# Patient Record
Sex: FEMALE | Race: WHITE | NOT HISPANIC OR LATINO | Employment: OTHER | URBAN - NONMETROPOLITAN AREA
[De-identification: names, ages, dates, MRNs, and addresses within clinical notes are randomized per-mention and may not be internally consistent; named-entity substitution may affect disease eponyms.]

---

## 2017-01-12 ENCOUNTER — OUTSIDE FACILITY SERVICE (OUTPATIENT)
Dept: CARDIOLOGY | Facility: CLINIC | Age: 67
End: 2017-01-12

## 2017-01-12 PROCEDURE — 93660 TILT TABLE EVALUATION: CPT | Performed by: INTERNAL MEDICINE

## 2017-02-03 ENCOUNTER — HOSPITAL ENCOUNTER (OUTPATIENT)
Dept: NEUROLOGY | Age: 67
Discharge: HOME | End: 2017-02-03
Payer: MEDICARE

## 2017-02-03 PROCEDURE — 95909 NRV CNDJ TST 5-6 STUDIES: CPT

## 2017-02-03 PROCEDURE — 95886 MUSC TEST DONE W/N TEST COMP: CPT

## 2017-02-03 PROCEDURE — 95909 NRV CNDJ TST 5-6 STUDIES: CPT | Performed by: PSYCHIATRY & NEUROLOGY

## 2017-02-03 PROCEDURE — 95886 MUSC TEST DONE W/N TEST COMP: CPT | Performed by: PSYCHIATRY & NEUROLOGY

## 2022-08-19 ENCOUNTER — HOME HEALTH ADMISSION (OUTPATIENT)
Dept: HOME HEALTH SERVICES | Facility: HOME HEALTHCARE | Age: 72
End: 2022-08-19

## 2023-10-31 ENCOUNTER — HOME HEALTH ADMISSION (OUTPATIENT)
Dept: HOME HEALTH SERVICES | Facility: HOME HEALTHCARE | Age: 73
End: 2023-10-31
Payer: MEDICARE

## 2023-10-31 ENCOUNTER — TRANSCRIBE ORDERS (OUTPATIENT)
Dept: HOME HEALTH SERVICES | Facility: HOME HEALTHCARE | Age: 73
End: 2023-10-31
Payer: MEDICARE

## 2023-10-31 DIAGNOSIS — G89.4 CHRONIC PAIN SYNDROME: Primary | ICD-10-CM

## 2023-11-02 ENCOUNTER — HOME CARE VISIT (OUTPATIENT)
Dept: HOME HEALTH SERVICES | Facility: CLINIC | Age: 73
End: 2023-11-02
Payer: MEDICARE

## 2023-11-03 ENCOUNTER — HOME CARE VISIT (OUTPATIENT)
Dept: HOME HEALTH SERVICES | Facility: CLINIC | Age: 73
End: 2023-11-03
Payer: MEDICARE

## 2023-11-03 PROCEDURE — G0151 HHCP-SERV OF PT,EA 15 MIN: HCPCS

## 2023-11-05 ENCOUNTER — HOME CARE VISIT (OUTPATIENT)
Dept: HOME HEALTH SERVICES | Facility: HOME HEALTHCARE | Age: 73
End: 2023-11-05
Payer: MEDICARE

## 2023-11-05 VITALS
RESPIRATION RATE: 17 BRPM | HEART RATE: 68 BPM | WEIGHT: 223 LBS | BODY MASS INDEX: 42.1 KG/M2 | HEIGHT: 61 IN | TEMPERATURE: 98 F | DIASTOLIC BLOOD PRESSURE: 78 MMHG | OXYGEN SATURATION: 98 % | SYSTOLIC BLOOD PRESSURE: 118 MMHG

## 2023-11-06 NOTE — HOME HEALTH
SOC Note:  Home Health ordered for: disciplines PT  Reason for Hosp/Primary Dx/Co-morbidities: chronic pain syndrome    Focus of Care: PT provided gait, transfer, balance, exercise/strength and safety training to decrease fall risk and allow safe access to community. Due to chronie pain    Patient's goal Patient and caregiver desires patient to walk better    Current Functional status/mobility/DME: patient requires cga to min assist for ambulation short distances with rolling walker, patient requires min assistance for transfers on/off of bed.    HB status/Living Arrangements: Patient lives with spouse in single story home, has rolling walker and shower seat    Skin Integrity/wound status: no wounds     Code Status: cpr    Fall Risk/Safety concerns: fall risk    Medication issues/Concerns:none    Additional Problems/Concerns: lab draws and nursing concerns    SDOH Barriers (i.e. caregiver concerns, social isolation, transportation, food insecurity, environment, income etc.)/Need for MSW: n/a    Plan for next visit: PT to est HEP next session

## 2023-11-07 NOTE — CASE COMMUNICATION
Patient seen in ED on 11/5/23 with primary diagnosis of DVT; now listed as inpatient as of 11/5/23.

## 2023-11-08 ENCOUNTER — HOME CARE VISIT (OUTPATIENT)
Dept: HOME HEALTH SERVICES | Facility: CLINIC | Age: 73
End: 2023-11-08
Payer: MEDICARE

## 2023-11-08 NOTE — CASE COMMUNICATION
Patient missed an OT visit from [facility] on 11-8-23    Reason: patient hospitalized    For your records only.   As per home health protocol, MD must be notified of missed/cancelled viists; therefore the prescribed frequency was not met.     MD: Karen Fontanez

## 2023-12-22 ENCOUNTER — TRANSCRIBE ORDERS (OUTPATIENT)
Dept: HOME HEALTH SERVICES | Facility: HOME HEALTHCARE | Age: 73
End: 2023-12-22
Payer: MEDICARE

## 2023-12-22 DIAGNOSIS — G89.4 CHRONIC PAIN SYNDROME: Primary | ICD-10-CM

## 2023-12-26 ENCOUNTER — HOME CARE VISIT (OUTPATIENT)
Dept: HOME HEALTH SERVICES | Facility: CLINIC | Age: 73
End: 2023-12-26
Payer: MEDICARE

## 2023-12-26 VITALS
HEART RATE: 81 BPM | RESPIRATION RATE: 18 BRPM | DIASTOLIC BLOOD PRESSURE: 70 MMHG | TEMPERATURE: 97.4 F | SYSTOLIC BLOOD PRESSURE: 110 MMHG | OXYGEN SATURATION: 93 %

## 2023-12-26 PROCEDURE — G0299 HHS/HOSPICE OF RN EA 15 MIN: HCPCS

## 2023-12-28 ENCOUNTER — HOME CARE VISIT (OUTPATIENT)
Dept: HOME HEALTH SERVICES | Facility: CLINIC | Age: 73
End: 2023-12-28
Payer: MEDICARE

## 2023-12-28 VITALS
RESPIRATION RATE: 18 BRPM | OXYGEN SATURATION: 95 % | DIASTOLIC BLOOD PRESSURE: 80 MMHG | HEART RATE: 78 BPM | TEMPERATURE: 97.7 F | SYSTOLIC BLOOD PRESSURE: 120 MMHG

## 2023-12-28 PROCEDURE — G0299 HHS/HOSPICE OF RN EA 15 MIN: HCPCS

## 2023-12-28 NOTE — Clinical Note
Please route to Karen Fontanez MD in [place]  60 Day Summary    Home Health need continues for: nursing, PT, OT, MSW    Primary diagnoses/co-morbidities/recent procedures in past 60 days that impact current episode:Chronic pain, weakness, dementia     Current level of functional ability: patient requires spouse for assistance with ADL's patient ambulating with walker, having multiple falls,  got a lift to help get patient up off floor.maximum assistance     Homebound status and living arrangements: Patient lives with spouse in single story home, has rolling walker and shower seat    Goals accomplished and/or measurable progress toward unmet goals in past 60 days: progress still working towards goals    Focus of care for next 60 days for each discipline ordered: chronic pain    Skin integrity/wound status: intact    Estimated date when home care services will end 3/1/24    SDOH changes/barriers (i.e. Caregiver availability, social isolation, environment, income, transportation access, food insecurity etc.) MSW referred    Need for MSW referral? yes    Plan for next visit: medication education, safety education, pain management

## 2023-12-29 NOTE — HOME HEALTH
Primary diagnoses/co-morbidities/recent procedures in past 60 days that impact current episode:Chronic pain, weakness, dementia     Current level of functional ability: patient requires spouse for assistance with ADL's patient ambulating with walker, having multiple falls,  got a lift to help get patient up off floor.maximum assistance     Homebound status and living arrangements: Patient lives with spouse in single story home, has rolling walker and shower seat    Goals accomplished and/or measurable progress toward unmet goals in past 60 days: progress still working towards goals    Focus of care for next 60 days for each discipline ordered: chronic pain    Skin integrity/wound status: intact    Estimated date when home care services will end 3/1/24    SDOH changes/barriers (i.e. Caregiver availability, social isolation, environment, income, transportation access, food insecurity etc.) MSW referred    Need for MSW referral? yes    Plan for next visit: medication education, safety education, pain management

## 2024-01-03 ENCOUNTER — HOME CARE VISIT (OUTPATIENT)
Dept: HOME HEALTH SERVICES | Facility: CLINIC | Age: 74
End: 2024-01-03
Payer: MEDICARE

## 2024-01-03 VITALS
TEMPERATURE: 97.7 F | SYSTOLIC BLOOD PRESSURE: 110 MMHG | HEART RATE: 70 BPM | OXYGEN SATURATION: 95 % | RESPIRATION RATE: 18 BRPM | DIASTOLIC BLOOD PRESSURE: 60 MMHG

## 2024-01-03 VITALS
DIASTOLIC BLOOD PRESSURE: 60 MMHG | OXYGEN SATURATION: 95 % | TEMPERATURE: 97.7 F | SYSTOLIC BLOOD PRESSURE: 110 MMHG | RESPIRATION RATE: 16 BRPM | HEART RATE: 49 BPM

## 2024-01-03 PROCEDURE — G0152 HHCP-SERV OF OT,EA 15 MIN: HCPCS

## 2024-01-03 PROCEDURE — G0299 HHS/HOSPICE OF RN EA 15 MIN: HCPCS

## 2024-01-03 NOTE — HOME HEALTH
Routine Visit Note: Patient states millicent is feeling better still has a little bit of cough, howver denies any recent falls since last visit, had OT eval prior to SN visit. Patient sitting in recliner with legs elevated. discussed and reviewed medications with spouse.    Skill/education provided: medication education, safety education, pain management    Patient/caregiver response: patient and spouse receptive to teaching    Plan for next visit: medication education, safety education, pain management    Other pertinent info:

## 2024-01-03 NOTE — HOME HEALTH
SUBJECTIVE: patient reports decreased strength and activity tolerance, decreased safety with ADLs with frequent falls  DX:  Chronic pain syndrome  PMH: most recent hospitalization with acute embolism and thrombosis of unspecified deep veins of lower extremity, abnormal gait and mobility, lack of coordination  SURGICAL PROCEDURE: no recent surgery  PRECAUTIONS: fall precautions  OXYGEN USE: cpap  EQUIPMENT: rollator, shower chair  PRIOR LEVEL OF FUNCTION: lives with spouse, had some assist with shower, and dressing  MEDICAL NECESSITY: skilled OT recommended to increase safety and independence with ADLs, UE strength and activity tolerance  PATIENT GOALS: to increase strength and safety with ADLs  COMMUNICATION / CARE COORDINATION:  with admitting RN  CURRENT INSURANCE: Medicare  DATE OF NEXT APPOINTMENT WITH DOCTOR: Spouse has multiple appts tomorrow and patient going with him, will not be home until 3:00. Jan 12 Dr. Fontanez 9:30  ANTICIPATED DISCHARGE PLAN: to self/family care when goals met  PATIENT/CAREGIVER AGREE WITH DISCHARGE PLAN: yes  SPECIFIC INTERVENTIONS AND GOALS TO ADDRESS ON NEXT VISIT: safety with aDL transfers  FREQUENCY AND DURATION: 1 wk 1, 2 wk 3      PATIENT HAS RECEIVED EDUCATION ON COVID-19, PREVENTION, HANDWASHING, SOCIAL DISTANCING PER CDC

## 2024-01-04 ENCOUNTER — HOME CARE VISIT (OUTPATIENT)
Dept: HOME HEALTH SERVICES | Facility: CLINIC | Age: 74
End: 2024-01-04
Payer: MEDICARE

## 2024-01-04 VITALS
TEMPERATURE: 98 F | SYSTOLIC BLOOD PRESSURE: 112 MMHG | DIASTOLIC BLOOD PRESSURE: 64 MMHG | OXYGEN SATURATION: 96 % | RESPIRATION RATE: 18 BRPM | HEART RATE: 64 BPM

## 2024-01-04 PROCEDURE — G0151 HHCP-SERV OF PT,EA 15 MIN: HCPCS

## 2024-01-05 ENCOUNTER — HOME CARE VISIT (OUTPATIENT)
Dept: HOME HEALTH SERVICES | Facility: CLINIC | Age: 74
End: 2024-01-05
Payer: MEDICARE

## 2024-01-05 VITALS
OXYGEN SATURATION: 95 % | DIASTOLIC BLOOD PRESSURE: 70 MMHG | HEART RATE: 68 BPM | RESPIRATION RATE: 18 BRPM | SYSTOLIC BLOOD PRESSURE: 118 MMHG | TEMPERATURE: 97.8 F

## 2024-01-05 PROCEDURE — G0300 HHS/HOSPICE OF LPN EA 15 MIN: HCPCS

## 2024-01-08 ENCOUNTER — HOME CARE VISIT (OUTPATIENT)
Dept: HOME HEALTH SERVICES | Facility: CLINIC | Age: 74
End: 2024-01-08
Payer: MEDICARE

## 2024-01-08 VITALS
RESPIRATION RATE: 18 BRPM | DIASTOLIC BLOOD PRESSURE: 80 MMHG | HEART RATE: 74 BPM | SYSTOLIC BLOOD PRESSURE: 110 MMHG | TEMPERATURE: 97.9 F | OXYGEN SATURATION: 96 %

## 2024-01-08 PROCEDURE — G0158 HHC OT ASSISTANT EA 15: HCPCS

## 2024-01-08 NOTE — HOME HEALTH
Routine Visit Note:    Skill/education provided: fall prevention, medication education    Patient/caregiver response: ongoing    Plan for next visit: fall prevention, medication education    Other pertinent info: No s/s of COVID voiced or noted.

## 2024-01-09 ENCOUNTER — HOME CARE VISIT (OUTPATIENT)
Dept: HOME HEALTH SERVICES | Facility: CLINIC | Age: 74
End: 2024-01-09
Payer: MEDICARE

## 2024-01-09 VITALS
OXYGEN SATURATION: 94 % | SYSTOLIC BLOOD PRESSURE: 118 MMHG | HEART RATE: 80 BPM | TEMPERATURE: 97.6 F | DIASTOLIC BLOOD PRESSURE: 70 MMHG | RESPIRATION RATE: 18 BRPM

## 2024-01-09 VITALS
SYSTOLIC BLOOD PRESSURE: 110 MMHG | RESPIRATION RATE: 18 BRPM | DIASTOLIC BLOOD PRESSURE: 80 MMHG | TEMPERATURE: 97.5 F | OXYGEN SATURATION: 95 % | HEART RATE: 78 BPM

## 2024-01-09 PROCEDURE — G0157 HHC PT ASSISTANT EA 15: HCPCS

## 2024-01-09 PROCEDURE — G0300 HHS/HOSPICE OF LPN EA 15 MIN: HCPCS

## 2024-01-09 NOTE — HOME HEALTH
Routine Visit Note:    Skill/education provided: medication education, fall prevention    Patient/caregiver response: ongoing    Plan for next visit: medication education, fall prevention    Other pertinent info: ongoing

## 2024-01-09 NOTE — HOME HEALTH
COVID:  No signs nor symptoms      SUBJECTIVE: Spouse reports both himself and pt have been sick with a cold        OBJECTIVE/REASON FOR VISIT:  ADL training with compensatory techniques        PHYSICIAN CONTACT: appointment on 1/11 at 9:30        PLAN  ADL training  and: BUE ther ex/HEP

## 2024-01-09 NOTE — HOME HEALTH
Covid 19 pre-screen performed.Pt states no falls or changes to insurance or medicaiton since last visit. Next visit to address gt training, bal, transfer training, and ed on HEP.

## 2024-01-10 ENCOUNTER — HOME CARE VISIT (OUTPATIENT)
Dept: HOME HEALTH SERVICES | Facility: CLINIC | Age: 74
End: 2024-01-10
Payer: MEDICARE

## 2024-01-10 PROCEDURE — G0158 HHC OT ASSISTANT EA 15: HCPCS

## 2024-01-11 ENCOUNTER — HOME CARE VISIT (OUTPATIENT)
Dept: HOME HEALTH SERVICES | Facility: CLINIC | Age: 74
End: 2024-01-11
Payer: MEDICARE

## 2024-01-12 ENCOUNTER — HOME CARE VISIT (OUTPATIENT)
Dept: HOME HEALTH SERVICES | Facility: CLINIC | Age: 74
End: 2024-01-12
Payer: MEDICARE

## 2024-01-12 VITALS
TEMPERATURE: 98.6 F | OXYGEN SATURATION: 97 % | HEART RATE: 85 BPM | SYSTOLIC BLOOD PRESSURE: 114 MMHG | DIASTOLIC BLOOD PRESSURE: 66 MMHG | RESPIRATION RATE: 18 BRPM

## 2024-01-12 VITALS
SYSTOLIC BLOOD PRESSURE: 120 MMHG | HEART RATE: 73 BPM | OXYGEN SATURATION: 95 % | RESPIRATION RATE: 18 BRPM | DIASTOLIC BLOOD PRESSURE: 88 MMHG | TEMPERATURE: 97.4 F

## 2024-01-12 PROCEDURE — G0157 HHC PT ASSISTANT EA 15: HCPCS

## 2024-01-12 NOTE — HOME HEALTH
COVID: no sign or symptoms      SUBJECTIVE: Pt reports not feeling well today        OBJECTIVE/PLAN FOR VISIT: BUE strengthening and HEP        PHYSICIAN CONTACT: appointments 1/11 and 1/12        PLAN for NEXT VISIT:ADL training with compensatory techniques and progress BUE strengthening

## 2024-01-12 NOTE — HOME HEALTH
Covid 19 pre-screen performed.Pt states no falls or changes to insurance or medicaiton since last visit. Pt states she went to her PCP this a.m. and no meds were changed. Next visit to address gt mechanics, transfer training, bal, and ed on HEP.

## 2024-01-15 ENCOUNTER — HOME CARE VISIT (OUTPATIENT)
Dept: HOME HEALTH SERVICES | Facility: HOME HEALTHCARE | Age: 74
End: 2024-01-15
Payer: MEDICARE

## 2024-01-15 NOTE — CASE COMMUNICATION
Patient missed a Nursing visit from [facility] on 1/11/2024.     Reason: MD Appointment.       For your records only.   Per CMS Guidance, MD must be notified of missed/cancelled visits; therefore the prescribed frequency was not met.

## 2024-01-16 ENCOUNTER — HOME CARE VISIT (OUTPATIENT)
Dept: HOME HEALTH SERVICES | Facility: CLINIC | Age: 74
End: 2024-01-16
Payer: MEDICARE

## 2024-01-16 VITALS
SYSTOLIC BLOOD PRESSURE: 110 MMHG | RESPIRATION RATE: 18 BRPM | HEART RATE: 69 BPM | TEMPERATURE: 98 F | DIASTOLIC BLOOD PRESSURE: 60 MMHG | OXYGEN SATURATION: 93 %

## 2024-01-16 VITALS
TEMPERATURE: 98 F | OXYGEN SATURATION: 93 % | DIASTOLIC BLOOD PRESSURE: 60 MMHG | SYSTOLIC BLOOD PRESSURE: 116 MMHG | HEART RATE: 69 BPM | RESPIRATION RATE: 18 BRPM

## 2024-01-16 PROCEDURE — G0152 HHCP-SERV OF OT,EA 15 MIN: HCPCS

## 2024-01-16 PROCEDURE — G0157 HHC PT ASSISTANT EA 15: HCPCS

## 2024-01-16 NOTE — HOME HEALTH
COVID SCREENING: No signs or symptoms of Covid     SUBJECTIVE: reports she showered this morning with spouse assisting and all went well.    FOCUS OF CARE/SKILLED NEED: ADL training on safety with grooming, toileting, functional mobility and ADL transfers    PHYSICIAN CONTACT: to be scheduled    PLAN FOR NEXT VISIT: progress with ADL training.

## 2024-01-16 NOTE — HOME HEALTH
Covid 19 pre-screen performed.Pt states no falls or changes to insurance or medicaiton since last visit. OT just left home and she checked pt's vitals. Next visit to address gt training, transfer training, bal, & HEP.

## 2024-01-17 ENCOUNTER — HOME CARE VISIT (OUTPATIENT)
Dept: HOME HEALTH SERVICES | Facility: CLINIC | Age: 74
End: 2024-01-17
Payer: MEDICARE

## 2024-01-17 VITALS
TEMPERATURE: 97.3 F | SYSTOLIC BLOOD PRESSURE: 104 MMHG | OXYGEN SATURATION: 93 % | HEART RATE: 61 BPM | RESPIRATION RATE: 18 BRPM | DIASTOLIC BLOOD PRESSURE: 72 MMHG

## 2024-01-17 PROCEDURE — G0299 HHS/HOSPICE OF RN EA 15 MIN: HCPCS

## 2024-01-17 NOTE — HOME HEALTH
Routine Visit Note: Patient states millicent is feeling good and doing better cough is now gone, getting stronger working with PT and OT denies any recent falls since last visit, Patient sitting in recliner with legs elevated. discussed and reviewed medications with spouse. Patient and spouse dicussed and decided on only needing 1 visit per week by nursing due to the improvement of patient.  Skill/education provided: medication education, safety education, pain management   Patient/caregiver response: patient and spouse receptive to teaching   Plan for next visit: medication education, safety education, pain management   Other pertinent info: none

## 2024-01-18 ENCOUNTER — HOME CARE VISIT (OUTPATIENT)
Dept: HOME HEALTH SERVICES | Facility: CLINIC | Age: 74
End: 2024-01-18
Payer: MEDICARE

## 2024-01-18 VITALS
DIASTOLIC BLOOD PRESSURE: 70 MMHG | OXYGEN SATURATION: 95 % | HEART RATE: 73 BPM | SYSTOLIC BLOOD PRESSURE: 100 MMHG | RESPIRATION RATE: 16 BRPM | TEMPERATURE: 97.4 F

## 2024-01-18 PROCEDURE — G0158 HHC OT ASSISTANT EA 15: HCPCS

## 2024-01-18 PROCEDURE — G0157 HHC PT ASSISTANT EA 15: HCPCS

## 2024-01-18 PROCEDURE — G0152 HHCP-SERV OF OT,EA 15 MIN: HCPCS

## 2024-01-18 NOTE — HOME HEALTH
Covid 19 pre-screen performed.Pt states no falls or changes to insurance or medicaiton since last visit. Next visit to address gt training, transfer training, bal, and ed on HEP.

## 2024-01-19 VITALS
HEART RATE: 73 BPM | TEMPERATURE: 97.4 F | RESPIRATION RATE: 16 BRPM | SYSTOLIC BLOOD PRESSURE: 100 MMHG | OXYGEN SATURATION: 95 % | DIASTOLIC BLOOD PRESSURE: 70 MMHG

## 2024-01-19 NOTE — HOME HEALTH
COVID: no signs or symptoms      SUBJECTIVE: Patient reports feeling better        OBJECTIVE/REASON for VISIT: ADL transfer training and BUE ther ex/HEP        PHYSICIAN CONTACT:  appointment with Dr. Dey 1/25 at 12:15         PLAN NEXT VISIT:ADL training and progression with HEP

## 2024-01-22 ENCOUNTER — HOME CARE VISIT (OUTPATIENT)
Dept: HOME HEALTH SERVICES | Facility: CLINIC | Age: 74
End: 2024-01-22
Payer: MEDICARE

## 2024-01-22 VITALS
DIASTOLIC BLOOD PRESSURE: 50 MMHG | TEMPERATURE: 97.7 F | HEART RATE: 73 BPM | SYSTOLIC BLOOD PRESSURE: 90 MMHG | OXYGEN SATURATION: 96 % | RESPIRATION RATE: 18 BRPM

## 2024-01-22 PROCEDURE — G0152 HHCP-SERV OF OT,EA 15 MIN: HCPCS

## 2024-01-22 NOTE — HOME HEALTH
COVID SCREENING: No signs or symptoms of Covid     SUBJECTIVE:patient reports feeling weak today    FOCUS OF CARE/SKILLED NEED: ADL training on safety and energy conservation during kitchen tasks.    PHYSICIAN CONTACT: to be scheduled    PLAN FOR NEXT VISIT: complete ADL training, plan for d/c

## 2024-01-23 ENCOUNTER — HOME CARE VISIT (OUTPATIENT)
Dept: HOME HEALTH SERVICES | Facility: CLINIC | Age: 74
End: 2024-01-23
Payer: MEDICARE

## 2024-01-23 VITALS
HEART RATE: 79 BPM | RESPIRATION RATE: 18 BRPM | TEMPERATURE: 97.8 F | SYSTOLIC BLOOD PRESSURE: 100 MMHG | OXYGEN SATURATION: 96 % | DIASTOLIC BLOOD PRESSURE: 80 MMHG

## 2024-01-23 PROCEDURE — G0157 HHC PT ASSISTANT EA 15: HCPCS

## 2024-01-23 NOTE — HOME HEALTH
Covid 19 pre-screen performed.Pt states no falls or changes to insurance or medicaiton since last visit. Next visit to address gt training, transfer training, bal, & progressive HEP.

## 2024-01-24 ENCOUNTER — HOME CARE VISIT (OUTPATIENT)
Dept: HOME HEALTH SERVICES | Facility: CLINIC | Age: 74
End: 2024-01-24
Payer: MEDICARE

## 2024-01-24 VITALS
RESPIRATION RATE: 18 BRPM | OXYGEN SATURATION: 94 % | TEMPERATURE: 97.2 F | HEART RATE: 69 BPM | DIASTOLIC BLOOD PRESSURE: 55 MMHG | SYSTOLIC BLOOD PRESSURE: 105 MMHG

## 2024-01-24 PROCEDURE — G0152 HHCP-SERV OF OT,EA 15 MIN: HCPCS

## 2024-01-24 NOTE — HOME HEALTH
COVID SCREENING: No signs or symptoms of Covid     SUBJECTIVE: verbalized understanding of all dc education. Patient reports some pain in right sholder with ther-ex. Exercises modified.    FOCUS OF CARE/SKILLED NEED: completed training on HEP and ADLs    PHYSICIAN CONTACT: today with Dr. Lam

## 2024-01-25 ENCOUNTER — HOME CARE VISIT (OUTPATIENT)
Dept: HOME HEALTH SERVICES | Facility: CLINIC | Age: 74
End: 2024-01-25
Payer: MEDICARE

## 2024-01-25 VITALS
TEMPERATURE: 97.5 F | HEART RATE: 68 BPM | SYSTOLIC BLOOD PRESSURE: 104 MMHG | OXYGEN SATURATION: 95 % | DIASTOLIC BLOOD PRESSURE: 80 MMHG | RESPIRATION RATE: 18 BRPM

## 2024-01-25 PROCEDURE — G0157 HHC PT ASSISTANT EA 15: HCPCS

## 2024-01-25 NOTE — HOME HEALTH
Covid 19 pre-screen performed.Pt states no falls or changes to insurance or medicaiton since last visit. Pt states she has an appt today w/Dr Dey for pain injection. Next visit to address gt training, bal, & ed on HEP.

## 2024-01-26 ENCOUNTER — HOME CARE VISIT (OUTPATIENT)
Dept: HOME HEALTH SERVICES | Facility: CLINIC | Age: 74
End: 2024-01-26
Payer: MEDICARE

## 2024-01-26 PROCEDURE — G0300 HHS/HOSPICE OF LPN EA 15 MIN: HCPCS

## 2024-01-28 VITALS
TEMPERATURE: 97.1 F | SYSTOLIC BLOOD PRESSURE: 112 MMHG | OXYGEN SATURATION: 95 % | DIASTOLIC BLOOD PRESSURE: 78 MMHG | RESPIRATION RATE: 20 BRPM | HEART RATE: 66 BPM

## 2024-01-29 NOTE — HOME HEALTH
Routine Visit Note:    SKill/Education Provided: Assessment, education-fall prevention/med review/nutrition reinforcement    Patient/Caregiver Response: Patient voices understanding with all education this visit.     Falls: No    Med Changes: No    Physician Contact: No     Plan for Next Visit: Assessment, education    Other: No s/s of Covid noted.

## 2024-01-31 ENCOUNTER — HOME CARE VISIT (OUTPATIENT)
Dept: HOME HEALTH SERVICES | Facility: CLINIC | Age: 74
End: 2024-01-31
Payer: MEDICARE

## 2024-01-31 VITALS
TEMPERATURE: 97.5 F | OXYGEN SATURATION: 95 % | HEART RATE: 74 BPM | DIASTOLIC BLOOD PRESSURE: 62 MMHG | RESPIRATION RATE: 18 BRPM | SYSTOLIC BLOOD PRESSURE: 90 MMHG

## 2024-01-31 VITALS
HEART RATE: 74 BPM | TEMPERATURE: 97.5 F | RESPIRATION RATE: 18 BRPM | DIASTOLIC BLOOD PRESSURE: 62 MMHG | SYSTOLIC BLOOD PRESSURE: 90 MMHG | OXYGEN SATURATION: 95 %

## 2024-01-31 PROCEDURE — G0157 HHC PT ASSISTANT EA 15: HCPCS

## 2024-01-31 PROCEDURE — G0299 HHS/HOSPICE OF RN EA 15 MIN: HCPCS

## 2024-01-31 NOTE — HOME HEALTH
Routine Visit Note:     SKill/Education Provided: Assessment, education-fall prevention/med review/nutrition reinforcement     Patient/Caregiver Response: patient and spouse receptive to teaching     Falls: No     Med Changes: No     Physician Contact: No     Plan for Next Visit: medication education, safety education, pain management     Other: No s/s of Covid noted

## 2024-01-31 NOTE — HOME HEALTH
Covid 19 pre-screen performed.Pt states no falls or changes to insurance or medicaiton since last visit. Pt very fatigued due to going out yesterday.  PT to assess next visit for d/c.

## 2024-02-01 ENCOUNTER — HOME CARE VISIT (OUTPATIENT)
Dept: HOME HEALTH SERVICES | Facility: CLINIC | Age: 74
End: 2024-02-01
Payer: MEDICARE

## 2024-02-01 PROCEDURE — G0151 HHCP-SERV OF PT,EA 15 MIN: HCPCS

## 2024-02-02 VITALS
RESPIRATION RATE: 18 BRPM | DIASTOLIC BLOOD PRESSURE: 70 MMHG | OXYGEN SATURATION: 95 % | SYSTOLIC BLOOD PRESSURE: 120 MMHG | TEMPERATURE: 98 F | HEART RATE: 68 BPM

## 2024-02-08 ENCOUNTER — HOME CARE VISIT (OUTPATIENT)
Dept: HOME HEALTH SERVICES | Facility: CLINIC | Age: 74
End: 2024-02-08
Payer: MEDICARE

## 2024-02-12 NOTE — CASE COMMUNICATION
Patient missed a Nursing visit from [facility] on 2/08/2024.     Reason:Patient has 2 MD Appointments today and  did not want visit this week.       For your records only.   Per CMS Guidance, MD must be notified of missed/cancelled visits; therefore the prescribed frequency was not met.

## 2024-02-15 ENCOUNTER — HOME CARE VISIT (OUTPATIENT)
Dept: HOME HEALTH SERVICES | Facility: CLINIC | Age: 74
End: 2024-02-15
Payer: MEDICARE

## 2024-02-15 VITALS
OXYGEN SATURATION: 96 % | RESPIRATION RATE: 18 BRPM | DIASTOLIC BLOOD PRESSURE: 64 MMHG | HEART RATE: 62 BPM | TEMPERATURE: 98.2 F | SYSTOLIC BLOOD PRESSURE: 118 MMHG

## 2024-02-15 PROCEDURE — G0300 HHS/HOSPICE OF LPN EA 15 MIN: HCPCS

## 2024-02-22 ENCOUNTER — HOME CARE VISIT (OUTPATIENT)
Dept: HOME HEALTH SERVICES | Facility: CLINIC | Age: 74
End: 2024-02-22
Payer: MEDICARE

## 2024-02-23 ENCOUNTER — HOME CARE VISIT (OUTPATIENT)
Dept: HOME HEALTH SERVICES | Facility: CLINIC | Age: 74
End: 2024-02-23
Payer: MEDICARE

## 2024-02-23 PROCEDURE — G0299 HHS/HOSPICE OF RN EA 15 MIN: HCPCS

## 2024-02-25 VITALS
TEMPERATURE: 97.3 F | HEART RATE: 65 BPM | SYSTOLIC BLOOD PRESSURE: 122 MMHG | OXYGEN SATURATION: 96 % | RESPIRATION RATE: 18 BRPM | DIASTOLIC BLOOD PRESSURE: 82 MMHG

## 2024-02-26 NOTE — HOME HEALTH
Routine Visit Note: Patient is agreeable and states she feels ready and comfotable with discharge, NOMNC signed by patient    Skill/education provided: Neuro check, fall prevention, medication education     Patient/caregiver response: Patient educated on fall prevention and medication education. Pt. v/u by teach back method.     Plan for next visit: Neuro check, fall prevention, medication education     Other pertinent info: No s/s of COVID voiced or noted.

## 2024-02-29 ENCOUNTER — HOME CARE VISIT (OUTPATIENT)
Dept: HOME HEALTH SERVICES | Facility: CLINIC | Age: 74
End: 2024-02-29
Payer: MEDICARE

## 2024-02-29 VITALS
TEMPERATURE: 97.7 F | DIASTOLIC BLOOD PRESSURE: 68 MMHG | RESPIRATION RATE: 18 BRPM | SYSTOLIC BLOOD PRESSURE: 102 MMHG | OXYGEN SATURATION: 97 % | HEART RATE: 63 BPM

## 2024-02-29 PROCEDURE — G0299 HHS/HOSPICE OF RN EA 15 MIN: HCPCS

## 2024-02-29 NOTE — Clinical Note
Please route to Karen Fontanez in [place]    Discharge Summary/Summary of Care Provided: patient reciceved HH for abnormal gait and mobility, lack of coordination, weakness and need for assistance for ADLS's and chronic pain, at this time all goals are met and patient and spouse are agreeable for discharge.     Patient received home health for diagnosis: Chronic pain syndrome     Current level of functional ability: ambulates in home with walker, spouse assists with ADLS    Living arrangements: lives in home with spouse    Progress towards goals and/or Were all goals met? all goals met    If not all goals met, barriers that prevented patient from meeting goals: all goals met    SDOH concerns (i.e. Caregiver availability, social isolation, environment, income, transportation access, food insecurity etc.)none    Follow-up appointment plans and community resources provided: in folder    Other imporant information. none

## 2024-03-01 NOTE — HOME HEALTH
Discharge Summary/Summary of Care Provided: patient reciceved HH for abnormal gait and mobility, lack of coordination, weakness and need for assistance for ADLS's and chronic pain, at this time all goals are met and patient and spouse are agreeable for discharge.     Patient received home health for diagnosis: Chronic pain syndrome     Current level of functional ability: ambulates in home with walker, spouse assists with ADLS    Living arrangements: lives in home with spouse    Progress towards goals and/or Were all goals met? all goals met    If not all goals met, barriers that prevented patient from meeting goals: all goals met    [facility] concerns (i.e. Caregiver availability, social isolation, environment, income, transportation access, food insecurity etc.)none    Follow-up appointment plans and community resources provided: in folder    Other imporant information. none

## 2024-06-18 ENCOUNTER — HOSPITAL ENCOUNTER (INPATIENT)
Age: 74
LOS: 3 days | Discharge: HOME | End: 2024-06-21
Attending: INTERNAL MEDICINE
Payer: MEDICARE

## 2024-06-18 PROCEDURE — 1200000000 HC SEMI PRIVATE

## 2024-06-19 ENCOUNTER — APPOINTMENT (OUTPATIENT)
Dept: ULTRASOUND IMAGING | Age: 74
End: 2024-06-19
Attending: INTERNAL MEDICINE
Payer: MEDICARE

## 2024-06-19 PROBLEM — K21.9 GASTRO-ESOPHAGEAL REFLUX DISEASE WITHOUT ESOPHAGITIS: Status: ACTIVE | Noted: 2022-07-29

## 2024-06-19 PROBLEM — I25.10 ATHEROSCLEROTIC HEART DISEASE OF NATIVE CORONARY ARTERY WITHOUT ANGINA PECTORIS: Status: ACTIVE | Noted: 2022-07-29

## 2024-06-19 PROBLEM — F32.9 MAJOR DEPRESSIVE DISORDER, SINGLE EPISODE, UNSPECIFIED: Status: ACTIVE | Noted: 2022-07-29

## 2024-06-19 PROBLEM — I10 ESSENTIAL HYPERTENSION: Status: ACTIVE | Noted: 2024-06-19

## 2024-06-19 PROBLEM — F41.1 GENERALIZED ANXIETY DISORDER: Status: ACTIVE | Noted: 2022-07-29

## 2024-06-19 PROBLEM — N18.30 STAGE 3 CHRONIC KIDNEY DISEASE (HCC): Status: ACTIVE | Noted: 2020-09-30

## 2024-06-19 PROBLEM — E78.5 HYPERLIPIDEMIA: Status: ACTIVE | Noted: 2024-06-19

## 2024-06-19 PROBLEM — G47.33 OBSTRUCTIVE SLEEP APNEA (ADULT) (PEDIATRIC): Status: ACTIVE | Noted: 2022-07-29

## 2024-06-19 PROBLEM — N17.9 AKI (ACUTE KIDNEY INJURY) (HCC): Status: ACTIVE | Noted: 2024-06-19

## 2024-06-19 PROBLEM — F31.9 BIPOLAR DISORDER (HCC): Status: ACTIVE | Noted: 2022-07-29

## 2024-06-19 PROBLEM — G47.00 INSOMNIA: Status: ACTIVE | Noted: 2024-06-19

## 2024-06-19 PROBLEM — I50.22 HEART FAILURE WITH MILDLY REDUCED EJECTION FRACTION (HCC): Status: ACTIVE | Noted: 2023-09-19

## 2024-06-19 LAB
25(OH)D3 SERPL-MCNC: 82.4 NG/ML
ALBUMIN SERPL-MCNC: 3.6 G/DL (ref 3.5–5.2)
ALP SERPL-CCNC: 120 U/L (ref 35–104)
ALT SERPL-CCNC: 10 U/L (ref 5–33)
ANION GAP SERPL CALCULATED.3IONS-SCNC: 18 MMOL/L (ref 7–19)
AST SERPL-CCNC: 17 U/L (ref 5–32)
BASOPHILS # BLD: 0.1 K/UL (ref 0–0.2)
BASOPHILS NFR BLD: 0.7 % (ref 0–1)
BILIRUB SERPL-MCNC: 0.5 MG/DL (ref 0.2–1.2)
BUN SERPL-MCNC: 73 MG/DL (ref 8–23)
CALCIUM SERPL-MCNC: 9.4 MG/DL (ref 8.8–10.2)
CHLORIDE SERPL-SCNC: 98 MMOL/L (ref 98–111)
CO2 SERPL-SCNC: 25 MMOL/L (ref 22–29)
CREAT SERPL-MCNC: 4.2 MG/DL (ref 0.5–0.9)
CREAT UR-MCNC: 75.3 MG/DL (ref 28–217)
EOSINOPHIL # BLD: 0.3 K/UL (ref 0–0.6)
EOSINOPHIL NFR BLD: 2.9 % (ref 0–5)
ERYTHROCYTE [DISTWIDTH] IN BLOOD BY AUTOMATED COUNT: 12.4 % (ref 11.5–14.5)
GLUCOSE SERPL-MCNC: 100 MG/DL (ref 74–109)
HAV IGM SERPL QL IA: NORMAL
HBV CORE IGM SERPL QL IA: NORMAL
HBV SURFACE AB SERPL IA-ACNC: NORMAL M[IU]/ML
HBV SURFACE AG SERPL QL IA: NORMAL
HCT VFR BLD AUTO: 39.3 % (ref 37–47)
HCV AB SERPL QL IA: NORMAL
HGB BLD-MCNC: 12.5 G/DL (ref 12–16)
IMM GRANULOCYTES # BLD: 0 K/UL
LYMPHOCYTES # BLD: 3.9 K/UL (ref 1.1–4.5)
LYMPHOCYTES NFR BLD: 43.3 % (ref 20–40)
MAGNESIUM SERPL-MCNC: 2.7 MG/DL (ref 1.6–2.4)
MCH RBC QN AUTO: 32.3 PG (ref 27–31)
MCHC RBC AUTO-ENTMCNC: 31.8 G/DL (ref 33–37)
MCV RBC AUTO: 101.6 FL (ref 81–99)
MICROALBUMIN UR-MCNC: <1.2 MG/DL (ref 0–19)
MICROALBUMIN/CREAT UR-RTO: NORMAL MG/G
MONOCYTES # BLD: 0.8 K/UL (ref 0–0.9)
MONOCYTES NFR BLD: 8.6 % (ref 0–10)
NEUTROPHILS # BLD: 4 K/UL (ref 1.5–7.5)
NEUTS SEG NFR BLD: 44.1 % (ref 50–65)
PLATELET # BLD AUTO: 354 K/UL (ref 130–400)
PMV BLD AUTO: 10 FL (ref 9.4–12.3)
POTASSIUM SERPL-SCNC: 3.7 MMOL/L (ref 3.5–5)
PROT SERPL-MCNC: 5.7 G/DL (ref 6.6–8.7)
PTH-INTACT SERPL-MCNC: 77.3 PG/ML (ref 15–65)
RBC # BLD AUTO: 3.87 M/UL (ref 4.2–5.4)
SODIUM SERPL-SCNC: 141 MMOL/L (ref 136–145)
SODIUM UR-SCNC: 65 MMOL/L
WBC # BLD AUTO: 9.1 K/UL (ref 4.8–10.8)

## 2024-06-19 PROCEDURE — 94760 N-INVAS EAR/PLS OXIMETRY 1: CPT

## 2024-06-19 PROCEDURE — 36415 COLL VENOUS BLD VENIPUNCTURE: CPT

## 2024-06-19 PROCEDURE — 80053 COMPREHEN METABOLIC PANEL: CPT

## 2024-06-19 PROCEDURE — 6360000002 HC RX W HCPCS: Performed by: INTERNAL MEDICINE

## 2024-06-19 PROCEDURE — 86706 HEP B SURFACE ANTIBODY: CPT

## 2024-06-19 PROCEDURE — 6370000000 HC RX 637 (ALT 250 FOR IP): Performed by: INTERNAL MEDICINE

## 2024-06-19 PROCEDURE — 84300 ASSAY OF URINE SODIUM: CPT

## 2024-06-19 PROCEDURE — 82043 UR ALBUMIN QUANTITATIVE: CPT

## 2024-06-19 PROCEDURE — 1200000000 HC SEMI PRIVATE

## 2024-06-19 PROCEDURE — 83735 ASSAY OF MAGNESIUM: CPT

## 2024-06-19 PROCEDURE — 83970 ASSAY OF PARATHORMONE: CPT

## 2024-06-19 PROCEDURE — 2580000003 HC RX 258: Performed by: INTERNAL MEDICINE

## 2024-06-19 PROCEDURE — 80074 ACUTE HEPATITIS PANEL: CPT

## 2024-06-19 PROCEDURE — 85025 COMPLETE CBC W/AUTO DIFF WBC: CPT

## 2024-06-19 PROCEDURE — 82306 VITAMIN D 25 HYDROXY: CPT

## 2024-06-19 PROCEDURE — 82570 ASSAY OF URINE CREATININE: CPT

## 2024-06-19 PROCEDURE — 76770 US EXAM ABDO BACK WALL COMP: CPT

## 2024-06-19 RX ORDER — SODIUM CHLORIDE, SODIUM LACTATE, POTASSIUM CHLORIDE, CALCIUM CHLORIDE 600; 310; 30; 20 MG/100ML; MG/100ML; MG/100ML; MG/100ML
INJECTION, SOLUTION INTRAVENOUS CONTINUOUS
Status: DISCONTINUED | OUTPATIENT
Start: 2024-06-19 | End: 2024-06-22 | Stop reason: HOSPADM

## 2024-06-19 RX ORDER — FLUTICASONE PROPIONATE 50 MCG
2 SPRAY, SUSPENSION (ML) NASAL DAILY
COMMUNITY

## 2024-06-19 RX ORDER — ACETAMINOPHEN 325 MG/1
650 TABLET ORAL EVERY 6 HOURS PRN
Status: DISCONTINUED | OUTPATIENT
Start: 2024-06-19 | End: 2024-06-22 | Stop reason: HOSPADM

## 2024-06-19 RX ORDER — ONDANSETRON 2 MG/ML
4 INJECTION INTRAMUSCULAR; INTRAVENOUS EVERY 6 HOURS PRN
Status: DISCONTINUED | OUTPATIENT
Start: 2024-06-19 | End: 2024-06-22 | Stop reason: HOSPADM

## 2024-06-19 RX ORDER — HEPARIN SODIUM 5000 [USP'U]/ML
5000 INJECTION, SOLUTION INTRAVENOUS; SUBCUTANEOUS EVERY 8 HOURS SCHEDULED
Status: DISCONTINUED | OUTPATIENT
Start: 2024-06-19 | End: 2024-06-19

## 2024-06-19 RX ORDER — TRAZODONE HYDROCHLORIDE 100 MG/1
200 TABLET ORAL NIGHTLY
Status: DISCONTINUED | OUTPATIENT
Start: 2024-06-19 | End: 2024-06-22 | Stop reason: HOSPADM

## 2024-06-19 RX ORDER — ACETAMINOPHEN 650 MG/1
650 SUPPOSITORY RECTAL EVERY 6 HOURS PRN
Status: DISCONTINUED | OUTPATIENT
Start: 2024-06-19 | End: 2024-06-22 | Stop reason: HOSPADM

## 2024-06-19 RX ORDER — LORAZEPAM 1 MG/1
0.5 TABLET ORAL 3 TIMES DAILY
Status: DISCONTINUED | OUTPATIENT
Start: 2024-06-19 | End: 2024-06-22 | Stop reason: HOSPADM

## 2024-06-19 RX ORDER — FLUTICASONE PROPIONATE 50 MCG
2 SPRAY, SUSPENSION (ML) NASAL DAILY
Status: DISCONTINUED | OUTPATIENT
Start: 2024-06-19 | End: 2024-06-22 | Stop reason: HOSPADM

## 2024-06-19 RX ORDER — LORAZEPAM 1 MG/1
1 TABLET ORAL DAILY
Status: ON HOLD | COMMUNITY
End: 2024-06-21 | Stop reason: HOSPADM

## 2024-06-19 RX ORDER — PHENOL 1.4 %
1 AEROSOL, SPRAY (ML) MUCOUS MEMBRANE DAILY
COMMUNITY

## 2024-06-19 RX ORDER — ESCITALOPRAM OXALATE 20 MG/1
20 TABLET ORAL DAILY
COMMUNITY

## 2024-06-19 RX ORDER — HYDROCODONE BITARTRATE AND ACETAMINOPHEN 10; 325 MG/1; MG/1
1 TABLET ORAL EVERY 8 HOURS PRN
COMMUNITY

## 2024-06-19 RX ORDER — SIMVASTATIN 20 MG
20 TABLET ORAL DAILY
COMMUNITY

## 2024-06-19 RX ORDER — NITROGLYCERIN 0.4 MG/1
0.4 TABLET SUBLINGUAL EVERY 5 MIN PRN
COMMUNITY

## 2024-06-19 RX ORDER — LANOLIN ALCOHOL/MO/W.PET/CERES
400 CREAM (GRAM) TOPICAL DAILY
COMMUNITY

## 2024-06-19 RX ORDER — ESCITALOPRAM OXALATE 10 MG/1
20 TABLET ORAL DAILY
Status: DISCONTINUED | OUTPATIENT
Start: 2024-06-19 | End: 2024-06-22 | Stop reason: HOSPADM

## 2024-06-19 RX ORDER — TRAZODONE HYDROCHLORIDE 50 MG/1
100 TABLET ORAL NIGHTLY
Status: ON HOLD | COMMUNITY
End: 2024-06-21 | Stop reason: HOSPADM

## 2024-06-19 RX ORDER — TRAZODONE HYDROCHLORIDE 100 MG/1
200 TABLET ORAL NIGHTLY
COMMUNITY

## 2024-06-19 RX ORDER — FUROSEMIDE 40 MG/1
40 TABLET ORAL 2 TIMES DAILY
COMMUNITY

## 2024-06-19 RX ORDER — POLYETHYLENE GLYCOL 3350 17 G/17G
17 POWDER, FOR SOLUTION ORAL DAILY PRN
Status: DISCONTINUED | OUTPATIENT
Start: 2024-06-19 | End: 2024-06-22 | Stop reason: HOSPADM

## 2024-06-19 RX ORDER — LANOLIN ALCOHOL/MO/W.PET/CERES
400 CREAM (GRAM) TOPICAL DAILY
Status: DISCONTINUED | OUTPATIENT
Start: 2024-06-19 | End: 2024-06-22 | Stop reason: HOSPADM

## 2024-06-19 RX ORDER — CALCIUM CARBONATE 500(1250)
1 TABLET ORAL DAILY
Status: DISCONTINUED | OUTPATIENT
Start: 2024-06-19 | End: 2024-06-22 | Stop reason: HOSPADM

## 2024-06-19 RX ORDER — PANTOPRAZOLE SODIUM 40 MG/1
40 TABLET, DELAYED RELEASE ORAL
Status: DISCONTINUED | OUTPATIENT
Start: 2024-06-19 | End: 2024-06-22 | Stop reason: HOSPADM

## 2024-06-19 RX ORDER — ONDANSETRON 4 MG/1
4 TABLET, ORALLY DISINTEGRATING ORAL EVERY 8 HOURS PRN
Status: DISCONTINUED | OUTPATIENT
Start: 2024-06-19 | End: 2024-06-22 | Stop reason: HOSPADM

## 2024-06-19 RX ORDER — TRAZODONE HYDROCHLORIDE 100 MG/1
100 TABLET ORAL NIGHTLY
Status: DISCONTINUED | OUTPATIENT
Start: 2024-06-19 | End: 2024-06-19

## 2024-06-19 RX ORDER — LANOLIN ALCOHOL/MO/W.PET/CERES
5000 CREAM (GRAM) TOPICAL DAILY
COMMUNITY

## 2024-06-19 RX ORDER — OMEPRAZOLE 20 MG/1
40 CAPSULE, DELAYED RELEASE ORAL 2 TIMES DAILY
COMMUNITY

## 2024-06-19 RX ORDER — SPIRONOLACTONE 25 MG/1
25 TABLET ORAL DAILY
COMMUNITY

## 2024-06-19 RX ORDER — FAMOTIDINE 40 MG/1
40 TABLET, FILM COATED ORAL DAILY
Status: ON HOLD | COMMUNITY
End: 2024-06-21 | Stop reason: HOSPADM

## 2024-06-19 RX ORDER — VITAMIN B COMPLEX
1 CAPSULE ORAL DAILY
COMMUNITY

## 2024-06-19 RX ORDER — ISOSORBIDE MONONITRATE 30 MG/1
30 TABLET, EXTENDED RELEASE ORAL DAILY
COMMUNITY

## 2024-06-19 RX ORDER — LORAZEPAM 0.5 MG/1
0.5 TABLET ORAL 3 TIMES DAILY
COMMUNITY

## 2024-06-19 RX ORDER — HYDROCODONE BITARTRATE AND ACETAMINOPHEN 10; 325 MG/1; MG/1
1 TABLET ORAL EVERY 8 HOURS PRN
Status: DISCONTINUED | OUTPATIENT
Start: 2024-06-19 | End: 2024-06-22 | Stop reason: HOSPADM

## 2024-06-19 RX ORDER — METOPROLOL SUCCINATE 25 MG/1
25 TABLET, EXTENDED RELEASE ORAL 2 TIMES DAILY
Status: DISCONTINUED | OUTPATIENT
Start: 2024-06-19 | End: 2024-06-22 | Stop reason: HOSPADM

## 2024-06-19 RX ORDER — METOPROLOL SUCCINATE 25 MG/1
25 TABLET, EXTENDED RELEASE ORAL 2 TIMES DAILY
COMMUNITY

## 2024-06-19 RX ORDER — ASPIRIN 81 MG/1
81 TABLET ORAL DAILY
COMMUNITY

## 2024-06-19 RX ORDER — ATORVASTATIN CALCIUM 10 MG/1
10 TABLET, FILM COATED ORAL DAILY
Status: DISCONTINUED | OUTPATIENT
Start: 2024-06-19 | End: 2024-06-22 | Stop reason: HOSPADM

## 2024-06-19 RX ADMIN — LORAZEPAM 0.5 MG: 1 TABLET ORAL at 15:58

## 2024-06-19 RX ADMIN — PANTOPRAZOLE SODIUM 40 MG: 40 TABLET, DELAYED RELEASE ORAL at 15:58

## 2024-06-19 RX ADMIN — POLYETHYLENE GLYCOL 3350 17 G: 17 POWDER, FOR SOLUTION ORAL at 16:05

## 2024-06-19 RX ADMIN — HEPARIN SODIUM 5000 UNITS: 5000 INJECTION INTRAVENOUS; SUBCUTANEOUS at 14:13

## 2024-06-19 RX ADMIN — ESCITALOPRAM OXALATE 20 MG: 10 TABLET ORAL at 15:58

## 2024-06-19 RX ADMIN — ATORVASTATIN CALCIUM 10 MG: 10 TABLET, FILM COATED ORAL at 15:58

## 2024-06-19 RX ADMIN — APIXABAN 5 MG: 5 TABLET, FILM COATED ORAL at 20:59

## 2024-06-19 RX ADMIN — LORAZEPAM 0.5 MG: 1 TABLET ORAL at 20:59

## 2024-06-19 RX ADMIN — HYDROCODONE BITARTRATE AND ACETAMINOPHEN 1 TABLET: 10; 325 TABLET ORAL at 11:11

## 2024-06-19 RX ADMIN — CALCIUM 500 MG: 500 TABLET ORAL at 15:58

## 2024-06-19 RX ADMIN — FLUTICASONE PROPIONATE 2 SPRAY: 50 SPRAY, METERED NASAL at 16:02

## 2024-06-19 RX ADMIN — FOLIC ACID TAB 400 MCG 400 MCG: 400 TAB at 15:58

## 2024-06-19 RX ADMIN — SODIUM CHLORIDE, POTASSIUM CHLORIDE, SODIUM LACTATE AND CALCIUM CHLORIDE: 600; 310; 30; 20 INJECTION, SOLUTION INTRAVENOUS at 00:53

## 2024-06-19 RX ADMIN — HEPARIN SODIUM 5000 UNITS: 5000 INJECTION INTRAVENOUS; SUBCUTANEOUS at 03:21

## 2024-06-19 RX ADMIN — TRAZODONE HYDROCHLORIDE 200 MG: 100 TABLET ORAL at 20:59

## 2024-06-19 RX ADMIN — TRAZODONE HYDROCHLORIDE 100 MG: 100 TABLET ORAL at 00:52

## 2024-06-19 ASSESSMENT — PAIN SCALES - GENERAL: PAINLEVEL_OUTOF10: 8

## 2024-06-19 NOTE — PROGRESS NOTES
Ileana Odonnell arrived to room # 511.   Presented with: kidney failure  Mental Status: Patient is oriented, alert, coherent, logical, thought processes intact, and able to concentrate and follow conversation.   Vitals:    06/18/24 2357   BP: (!) 102/50   Pulse: 56   Resp: 16   Temp: 98.3 °F (36.8 °C)   SpO2: 98%     Patient safety contract and falls prevention contract reviewed with patient Yes.  Oriented Patient to room.  Call light within reach. Yes.  Needs, issues or concerns expressed at this time: no.      Electronically signed by Cindy Beasley RN on 6/19/2024 at 1:06 AM

## 2024-06-19 NOTE — PROGRESS NOTES
4 Eyes Skin Assessment     NAME:  Ileana Odonnell  YOB: 1950  MEDICAL RECORD NUMBER:  [id number]    The patient is being assessed for  Admission    I agree that at least one RN has performed a thorough Head to Toe Skin Assessment on the patient. ALL assessment sites listed below have been assessed.      Areas assessed by both nurses:    Head, Face, Ears, Shoulders, Back, Chest, Arms, Elbows, Hands, Sacrum. Buttock, Coccyx, Ischium, Legs. Feet and Heels, and Under Medical Devices         Does the Patient have a Wound? No noted wound(s)       [place] Prevention initiated by RN: No  Wound Care Orders initiated by RN: No    Pressure Injury (Stage 3,4, Unstageable, DTI, NWPT, and Complex wounds) if present, place Wound referral order by RN under : No    New Ostomies, if present place, Ostomy referral order under : No     Nurse 1 eSignature: Electronically signed by Cindy Beasley RN on 6/19/24 at 1:07 AM CDT    **SHARE this note so that the co-signing nurse can place an eSignature**    Nurse 2 eSignature: Electronically signed by Hema King RN on 6/19/24 at 1:20 AM CDT

## 2024-06-19 NOTE — H&P
Hospitalist History & Physical  [facility]    Patient: Ileana Odonnell   : 1950   MRN: [id number]  Code Status: Full Code  PCP: Shiloh Leyva MD  Date of Service: 2024    Chief Complaint:   Abnormal lab work    History of Present Illness:   73-year-old female with past medical history as listed below initially presented to OSH ER with abnormal lab work.  History of CKD unknown stage.  Lab work revealed RIVKA.  Patient transferred to this facility for escalation of care.  Upon interview and examination, patient states she has not been feeling well x 1 week.  Poor oral intake.  Decreased urine output.  Denies dysuria, hematuria, nausea, vomiting, abdominal pain, chest pain, or palpitations.  No further history provided.    Review of Systems:   A comprehensive review of systems was negative except for: Genitourinary: positive for decreased urine output    Past Medical History:     Past Medical History:   Diagnosis Date    Anxiety     Back pain     [place] esophagus     Bipolar disorder (HCC)     Blood circulation, collateral     Chronic kidney disease     DVT (deep venous thrombosis) ([facility])     Dyspnea     Hypertension     Lymphedema     Neuropathy     Neuropathy          Past Surgical History:     Past Surgical History:   Procedure Laterality Date    APPENDECTOMY      BACK SURGERY      CATARACT EXTRACTION      CHOLECYSTECTOMY      GASTRIC BYPASS SURGERY      JOINT REPLACEMENT      ROTATOR CUFF REPAIR          Family History:   No family history on file.     Social History:     Social History     Socioeconomic History    Marital status:      Social Determinants of Health     Food Insecurity: No Food Insecurity (2024)    Hunger Vital Sign     Worried About Running Out of Food in the Last Year: Never true     Ran Out of Food in the Last Year: Never true   Transportation Needs: No Transportation Needs (2024)    PRAPARE - Transportation     Lack of Transportation (Medical): No      Lack of Transportation (Non-Medical): No    Social Connections ([facility] HRSN)   Housing Stability: Low Risk  (6/19/2024)    Housing Stability Vital Sign     Unable to Pay for Housing in the Last Year: No     Number of Places Lived in the Last Year: 1     Unstable Housing in the Last Year: No       Prior to Admission Medications:   Medications Prior to Admission: traZODone (DESYREL) 50 MG tablet, Take 2 tablets by mouth nightly  LORazepam (ATIVAN) 0.5 MG tablet, Take 1 tablet by mouth 3 times daily.  Pt takes every morning, mid afternoon, and nightly.  She takes two 0.5 mg tablets each dose Max Daily Amount: 1.5 mg     Allergies:   No Known Allergies      Physical Exam:   BP (!) 102/50   Pulse 56   Temp 98.3 °F (36.8 °C) (Temporal)   Resp 16   Ht 1.549 m (5' 1\")   Wt 88 kg (194 lb)   SpO2 98%   BMI 36.66 kg/m²     HEENT: normocephalic  Lungs: clear to auscultation bilaterally   Cardiovascular: s1 and s2 normal  Abdomen: soft, positive bowel sounds, nontender  Extremities: no cyanosis   Neuro: alert, answering questions, no acute focal motor deficits    No results found for this or any previous visit (from the past 72 hour(s)).    No intake/output data recorded.    No results found.    Assessment and Plan:   73-year-old female with history of CKD unknown stage admitted for RIVKA.    OSH lab work: Cr 4.65, BUN 77, K 4.0, CO2 30.7, AG 9, Na 138  Renal consulted  IVF  Avoid offending agents  Renal ultrasound  Follow renal function/urine output/electrolytes  Heparin for DVT prophylaxis  Discussed treatment plan with patient/RN    The above note was generated using voice recognition software. Inadvertent typographical errors in transcription may have occurred.    Kenroy Kitchen MD  6/19/2024 12:53 AM

## 2024-06-19 NOTE — CONSULTS
Nephrology ([facility]) Consult Note      Patient:  Ileana Odonnell  YOB: 1950  Date of Service: 6/19/2024  MRN: [id number]   Acct: [id number]   Primary Care Physician: Shiloh Leyva MD  Advance Directive: Full Code  Admit Date: 6/18/2024       Hospital Day: 1  Referring Provider: Rachid Nino MD    Patient independently seen and examined, Chart, Consults, Notes, Operative notes, Labs, Cardiology, and Radiology studies reviewed as available.    Chief complaint: Abnormal labs.    Subjective:  Ileana Odonnell is a 73 y.o. female for whom we were consulted for evaluation and treatment of acute kidney injury/chronic kidney disease.  She has stage IV chronic kidney disease baseline, follows ASHLEY Donohue in the office.  Patient has previous history of use of nonsteroidal agents.  Last estimated GFR in the office was 26 mL.  She was sent to the emergency room from her primary care office as she was found to have worsening of renal function.  Her serum creatinine now 4.2 mg.  Patient has noticed very little to no urine output.  She has been trying to drink at least 60 ounces of water a day.  Lately she has been complaining of nausea and constipation, unable to drink more water.    This morning she is fully alert and awake, complaining of back pain and severe constipation.  She is currently receiving IV fluid.  Patient denies any shortness of breath or swelling of legs.    Allergies:  Patient has no known allergies.    Medicines:  Current Facility-Administered Medications   Medication Dose Route Frequency Provider Last Rate Last Admin    traZODone (DESYREL) tablet 100 mg  100 mg Oral Nightly Kenroy Kitchen MD   100 mg at 06/19/24 0052    ondansetron (ZOFRAN-ODT) disintegrating tablet 4 mg  4 mg Oral Q8H PRN Kenroy Kitchen MD        Or    ondansetron (ZOFRAN) injection 4 mg  4 mg IntraVENous Q6H PRN Kenroy Kitchen MD        acetaminophen (TYLENOL) tablet 650 mg  650 mg  carotid bruits.  Chest:  clear to auscultation bilaterally  CVS: regular rate and rhythm  Abdominal: soft, nontender, normal bowel sounds/obesity  Extremities: no cyanosis or edema  Skin: warm and dry without rash      Labs:  BMP:   Recent Labs     06/19/24 0243      K 3.7   CL 98   CO2 25   BUN 73*   CREATININE 4.2*   CALCIUM 9.4     CBC:   Recent Labs     06/19/24 0243   WBC 9.1   HGB 12.5   HCT 39.3   .6*        LIVER PROFILE:   Recent Labs     06/19/24 0243   AST 17   ALT 10   BILITOT 0.5   ALKPHOS 120*     PT/INR: No results for input(s): \"PROTIME\", \"INR\" in the last 72 hours.  APTT: No results for input(s): \"APTT\" in the last 72 hours.  BNP:  No results for input(s): \"BNP\" in the last 72 hours.  Ionized Calcium:Invalid input(s): \"IONCA\"  Magnesium:  Recent Labs     06/19/24 0243   MG 2.7*     Phosphorus:No results for input(s): \"PHOS\" in the last 72 hours.  HgbA1C: No results for input(s): \"LABA1C\" in the last 72 hours.  Hepatic:   Recent Labs     06/19/24 0243   ALKPHOS 120*   ALT 10   AST 17   BILITOT 0.5     Lactic Acid: No results for input(s): \"LACTA\" in the last 72 hours.  Troponin: No results for input(s): \"CKTOTAL\", \"CKMB\", \"TROPONINT\" in the last 72 hours.  ABGs: No results for input(s): \"PH\", \"PCO2\", \"PO2\", \"HCO3\", \"O2SAT\" in the last 72 hours.  CRP:  No results for input(s): \"CRP\" in the last 72 hours.  Sed Rate:  No results for input(s): \"SEDRATE\" in the last 72 hours.      Cultures:   No results for input(s): \"CULTURE\" in the last 72 hours.  No results for input(s): \"BC\", \"BLOODCULT2\" in the last 72 hours.  No results for input(s): \"CXSURG\" in the last 72 hours.    Radiology reports as per the Radiologist  Radiology: No results found.     Assessment   1.  Acute kidney injury/worsening.  2.  Intravascular volume depletion versus ATN.  3.  Stage IV CKD baseline  4.  Previous long-term use of NSAIDs.  5.  Benign essential hypertension.  6.  Severe constipation.    Plan:  1.

## 2024-06-19 NOTE — PLAN OF CARE
Patient admitted this a.m.  Writer assumes care of patient this a.m.  Patient seen and examined.  Doing well.  No new complaints.  No acute changes or acute overnight event reported.  Continue current workup and management.

## 2024-06-20 LAB
ANION GAP SERPL CALCULATED.3IONS-SCNC: 13 MMOL/L (ref 7–19)
BUN SERPL-MCNC: 55 MG/DL (ref 8–23)
CALCIUM SERPL-MCNC: 8.9 MG/DL (ref 8.8–10.2)
CHLORIDE SERPL-SCNC: 101 MMOL/L (ref 98–111)
CO2 SERPL-SCNC: 25 MMOL/L (ref 22–29)
CREAT SERPL-MCNC: 2.9 MG/DL (ref 0.5–0.9)
GLUCOSE SERPL-MCNC: 110 MG/DL (ref 74–109)
POTASSIUM SERPL-SCNC: 4 MMOL/L (ref 3.5–5)
SODIUM SERPL-SCNC: 139 MMOL/L (ref 136–145)

## 2024-06-20 PROCEDURE — 94760 N-INVAS EAR/PLS OXIMETRY 1: CPT

## 2024-06-20 PROCEDURE — 80048 BASIC METABOLIC PNL TOTAL CA: CPT

## 2024-06-20 PROCEDURE — 1200000000 HC SEMI PRIVATE

## 2024-06-20 PROCEDURE — 36415 COLL VENOUS BLD VENIPUNCTURE: CPT

## 2024-06-20 PROCEDURE — 6370000000 HC RX 637 (ALT 250 FOR IP): Performed by: INTERNAL MEDICINE

## 2024-06-20 RX ADMIN — METOPROLOL SUCCINATE 25 MG: 25 TABLET, FILM COATED, EXTENDED RELEASE ORAL at 20:24

## 2024-06-20 RX ADMIN — FOLIC ACID TAB 400 MCG 400 MCG: 400 TAB at 09:19

## 2024-06-20 RX ADMIN — LORAZEPAM 0.5 MG: 1 TABLET ORAL at 09:19

## 2024-06-20 RX ADMIN — PANTOPRAZOLE SODIUM 40 MG: 40 TABLET, DELAYED RELEASE ORAL at 16:29

## 2024-06-20 RX ADMIN — LORAZEPAM 0.5 MG: 1 TABLET ORAL at 14:39

## 2024-06-20 RX ADMIN — ATORVASTATIN CALCIUM 10 MG: 10 TABLET, FILM COATED ORAL at 09:19

## 2024-06-20 RX ADMIN — TRAZODONE HYDROCHLORIDE 200 MG: 100 TABLET ORAL at 20:23

## 2024-06-20 RX ADMIN — PANTOPRAZOLE SODIUM 40 MG: 40 TABLET, DELAYED RELEASE ORAL at 05:06

## 2024-06-20 RX ADMIN — ESCITALOPRAM OXALATE 20 MG: 10 TABLET ORAL at 09:19

## 2024-06-20 RX ADMIN — APIXABAN 5 MG: 5 TABLET, FILM COATED ORAL at 20:23

## 2024-06-20 RX ADMIN — APIXABAN 5 MG: 5 TABLET, FILM COATED ORAL at 09:19

## 2024-06-20 RX ADMIN — CALCIUM 500 MG: 500 TABLET ORAL at 09:19

## 2024-06-20 RX ADMIN — LORAZEPAM 0.5 MG: 1 TABLET ORAL at 20:24

## 2024-06-20 RX ADMIN — METOPROLOL SUCCINATE 25 MG: 25 TABLET, FILM COATED, EXTENDED RELEASE ORAL at 09:19

## 2024-06-20 RX ADMIN — FLUTICASONE PROPIONATE 2 SPRAY: 50 SPRAY, METERED NASAL at 09:19

## 2024-06-20 RX ADMIN — HYDROCODONE BITARTRATE AND ACETAMINOPHEN 1 TABLET: 10; 325 TABLET ORAL at 14:39

## 2024-06-20 ASSESSMENT — PAIN SCALES - GENERAL: PAINLEVEL_OUTOF10: 7

## 2024-06-20 ASSESSMENT — PAIN DESCRIPTION - LOCATION: LOCATION: RECTUM

## 2024-06-20 NOTE — PROGRESS NOTES
Nephrology ([facility]) Progress Note      Patient:  Ileana Odonnell  YOB: 1950  Date of Service: 6/20/2024  MRN: [id number]   Acct: [id number]   Primary Care Physician: Shiloh Leyva MD  Advance Directive: Full Code  Admit Date: 6/18/2024       Hospital Day: 2  Referring Provider: Rachid Nino MD    Patient independently seen and examined, Chart, Consults, Notes, Operative notes, Labs, Cardiology, and Radiology studies reviewed as available.    Chief complaint: Abnormal labs.    Subjective:  Ileana Odonnell is a 73 y.o. female for whom we were consulted for evaluation and treatment of acute kidney injury/chronic kidney disease.  She has stage IV chronic kidney disease baseline, follows ASHLEY Donohue in the office.  Patient has previous history of use of nonsteroidal agents.  Last estimated GFR in the office was 26 mL.  She was sent to the emergency room from her primary care office as she was found to have worsening of renal function.  Her serum creatinine now 4.2 mg.  Patient has noticed very little to no urine output.  She has been trying to drink at least 60 ounces of water a day.  Lately she has been complaining of nausea and constipation, unable to drink more water.    This morning patient feels well.  She has responded to IV fluid and serum creatinine is slowly improving.    Allergies:  Patient has no known allergies.    Medicines:  Current Facility-Administered Medications   Medication Dose Route Frequency Provider Last Rate Last Admin    ondansetron (ZOFRAN-ODT) disintegrating tablet 4 mg  4 mg Oral Q8H PRN Kenroy Kitchen MD        Or    ondansetron (ZOFRAN) injection 4 mg  4 mg IntraVENous Q6H PRN Kenroy Kitchen MD        acetaminophen (TYLENOL) tablet 650 mg  650 mg Oral Q6H PRN Kenroy Kitchen MD        Or    acetaminophen (TYLENOL) suppository 650 mg  650 mg Rectal Q6H PRN Kenroy Kitchen MD        lactated ringers IV soln infusion   IntraVENous

## 2024-06-20 NOTE — PROGRESS NOTES
Romulo GOODMAN, informed this nurse that a white oval shaped pill was found in the bed with pt. Romulo brought pill to this nurse, I verified that it was 10/325 hydrocodone-acetaminophen (Norco). This nurse and Lorna LOPEZ wasted medication through [facility]. Norco has not been administered by this nurse during this shift. Charge nurse Yadira informed. Plan of care ongoing.     Witnessed the wasting of the found norco. Electronically signed by Lorna Blankenship RN on 6/20/2024 at 9:11 AM

## 2024-06-20 NOTE — PROGRESS NOTES
[facility] Progress Note    Patient:  Ileana Odonnell  YOB: 1950  Date of Service: 6/20/2024  MRN: [id number]   Acct: [id number]   Primary Care Physician: Shiloh Leyva MD  Advance Directive: Full Code  Admit Date: 6/18/2024       Hospital Day: 2        CHIEF COMPLAINT:  Abnormal lab work     =  6/20/2024 7:08 AM  Subjective / Interval History:   06/20/2024  Patient seen and examined this a.m. No new complaints.   Endorses overall improvement.   =No acute changes or acute overnight event reported. Laying comfortably in bed in no apparent acute distress.  Denies any acute complaints or distress.        Review of Systems:   Review of Systems  ROS: 14 point review of systems is negative except as specifically addressed above.    ADULT DIET; Regular; Low Fat/Low Chol/High Fiber/2 gm Na    Intake/Output Summary (Last 24 hours) at 6/20/2024 0708  Last data filed at 6/20/2024 0508  Gross per 24 hour   Intake 2306.16 ml   Output 800 ml   Net 1506.16 ml       Medications:   lactated ringers IV soln 100 mL/hr at 06/19/24 0053     Current Facility-Administered Medications   Medication Dose Route Frequency Provider Last Rate Last Admin    ondansetron (ZOFRAN-ODT) disintegrating tablet 4 mg  4 mg Oral Q8H PRN Kenroy Kitchen MD        Or    ondansetron (ZOFRAN) injection 4 mg  4 mg IntraVENous Q6H PRN Kenroy Kitchen MD        acetaminophen (TYLENOL) tablet 650 mg  650 mg Oral Q6H PRN Kenroy Kitchen MD        Or    acetaminophen (TYLENOL) suppository 650 mg  650 mg Rectal Q6H PRN Kenroy Kitchen MD        lactated ringers IV soln infusion   IntraVENous Continuous Kenroy Kitchen  mL/hr at 06/19/24 0053 New Bag at 06/19/24 0053    polyethylene glycol (GLYCOLAX) packet 17 g  17 g Oral Daily PRN Rachid Nino MD   17 g at 06/19/24 1605    HYDROcodone-acetaminophen (NORCO)  MG per tablet 1 tablet  1 tablet Oral Q8H PRN Rachid Nino MD   1 tablet at 06/19/24 1111    escitalopram  DVT  Apixaban 5 mg p.o. twice daily        Continue management of other chronic medical conditions - see above and orders.          Advance Directive: Full Code    ADULT DIET; Regular; Low Fat/Low Chol/High Fiber/2 gm Na         Consults Made:   IP CONSULT TO NEPHROLOGY      DVT prophylaxis: Apixaban      Current medications reviewed  Lab work reviewed  Radiology  films reviewed  Much appreciated treatment recommendations from suspecialities reviewed  Discussed treatment plan with the nurse and addressed all questions/concerns  Discussed with Patient at the bedside        Discharge planning: tbd      Multiple complex medical problems  Morbidity mortality high risk  Medical decision making High complexity       Electronically signed by   Rachid Nino MD,   Internal Medicine Hospitalist   6/20/2024 7:08 AM

## 2024-06-21 VITALS
TEMPERATURE: 97.9 F | DIASTOLIC BLOOD PRESSURE: 42 MMHG | BODY MASS INDEX: 36.63 KG/M2 | HEART RATE: 68 BPM | WEIGHT: 194 LBS | HEIGHT: 61 IN | OXYGEN SATURATION: 95 % | RESPIRATION RATE: 16 BRPM | SYSTOLIC BLOOD PRESSURE: 113 MMHG

## 2024-06-21 LAB
ALBUMIN SERPL-MCNC: 3.1 G/DL (ref 3.5–5.2)
ALP SERPL-CCNC: 105 U/L (ref 35–104)
ALT SERPL-CCNC: 10 U/L (ref 5–33)
ANION GAP SERPL CALCULATED.3IONS-SCNC: 10 MMOL/L (ref 7–19)
ANION GAP SERPL CALCULATED.3IONS-SCNC: 11 MMOL/L (ref 7–19)
AST SERPL-CCNC: 16 U/L (ref 5–32)
BILIRUB SERPL-MCNC: 0.6 MG/DL (ref 0.2–1.2)
BUN SERPL-MCNC: 39 MG/DL (ref 8–23)
BUN SERPL-MCNC: 44 MG/DL (ref 8–23)
CALCIUM SERPL-MCNC: 8.9 MG/DL (ref 8.8–10.2)
CALCIUM SERPL-MCNC: 9.4 MG/DL (ref 8.8–10.2)
CHLORIDE SERPL-SCNC: 103 MMOL/L (ref 98–111)
CHLORIDE SERPL-SCNC: 106 MMOL/L (ref 98–111)
CO2 SERPL-SCNC: 25 MMOL/L (ref 22–29)
CO2 SERPL-SCNC: 26 MMOL/L (ref 22–29)
CREAT SERPL-MCNC: 2.3 MG/DL (ref 0.5–0.9)
CREAT SERPL-MCNC: 2.5 MG/DL (ref 0.5–0.9)
ERYTHROCYTE [DISTWIDTH] IN BLOOD BY AUTOMATED COUNT: 12.7 % (ref 11.5–14.5)
GLUCOSE SERPL-MCNC: 131 MG/DL (ref 74–109)
GLUCOSE SERPL-MCNC: 92 MG/DL (ref 74–109)
HCT VFR BLD AUTO: 36.4 % (ref 37–47)
HGB BLD-MCNC: 11.8 G/DL (ref 12–16)
MCH RBC QN AUTO: 33.7 PG (ref 27–31)
MCHC RBC AUTO-ENTMCNC: 32.4 G/DL (ref 33–37)
MCV RBC AUTO: 104 FL (ref 81–99)
PLATELET # BLD AUTO: 314 K/UL (ref 130–400)
PMV BLD AUTO: 9.8 FL (ref 9.4–12.3)
POTASSIUM SERPL-SCNC: 3.9 MMOL/L (ref 3.5–5)
POTASSIUM SERPL-SCNC: 4.1 MMOL/L (ref 3.5–5)
PROT SERPL-MCNC: 5.6 G/DL (ref 6.6–8.7)
RBC # BLD AUTO: 3.5 M/UL (ref 4.2–5.4)
SODIUM SERPL-SCNC: 138 MMOL/L (ref 136–145)
SODIUM SERPL-SCNC: 143 MMOL/L (ref 136–145)
WBC # BLD AUTO: 10.5 K/UL (ref 4.8–10.8)

## 2024-06-21 PROCEDURE — 6370000000 HC RX 637 (ALT 250 FOR IP): Performed by: INTERNAL MEDICINE

## 2024-06-21 PROCEDURE — 6360000002 HC RX W HCPCS: Performed by: INTERNAL MEDICINE

## 2024-06-21 PROCEDURE — 80053 COMPREHEN METABOLIC PANEL: CPT

## 2024-06-21 PROCEDURE — 85027 COMPLETE CBC AUTOMATED: CPT

## 2024-06-21 PROCEDURE — 36415 COLL VENOUS BLD VENIPUNCTURE: CPT

## 2024-06-21 RX ADMIN — CALCIUM 500 MG: 500 TABLET ORAL at 09:35

## 2024-06-21 RX ADMIN — HYDROCODONE BITARTRATE AND ACETAMINOPHEN 1 TABLET: 10; 325 TABLET ORAL at 05:59

## 2024-06-21 RX ADMIN — ATORVASTATIN CALCIUM 10 MG: 10 TABLET, FILM COATED ORAL at 09:35

## 2024-06-21 RX ADMIN — ESCITALOPRAM OXALATE 20 MG: 10 TABLET ORAL at 09:35

## 2024-06-21 RX ADMIN — LORAZEPAM 0.5 MG: 1 TABLET ORAL at 09:35

## 2024-06-21 RX ADMIN — FOLIC ACID TAB 400 MCG 400 MCG: 400 TAB at 09:35

## 2024-06-21 RX ADMIN — FLUTICASONE PROPIONATE 2 SPRAY: 50 SPRAY, METERED NASAL at 09:36

## 2024-06-21 RX ADMIN — APIXABAN 5 MG: 5 TABLET, FILM COATED ORAL at 09:36

## 2024-06-21 RX ADMIN — PANTOPRAZOLE SODIUM 40 MG: 40 TABLET, DELAYED RELEASE ORAL at 16:52

## 2024-06-21 RX ADMIN — ONDANSETRON 4 MG: 2 INJECTION INTRAMUSCULAR; INTRAVENOUS at 09:36

## 2024-06-21 RX ADMIN — PANTOPRAZOLE SODIUM 40 MG: 40 TABLET, DELAYED RELEASE ORAL at 05:39

## 2024-06-21 RX ADMIN — LORAZEPAM 0.5 MG: 1 TABLET ORAL at 16:52

## 2024-06-21 ASSESSMENT — PAIN SCALES - GENERAL: PAINLEVEL_OUTOF10: 9

## 2024-06-21 ASSESSMENT — PAIN DESCRIPTION - LOCATION: LOCATION: BACK

## 2024-06-21 NOTE — PROGRESS NOTES
Nephrology ([facility]) Progress Note      Patient:  Ileana Odonnell  YOB: 1950  Date of Service: 6/21/2024  MRN: [id number]   Acct: [id number]   Primary Care Physician: Shiloh Leyva MD  Advance Directive: Full Code  Admit Date: 6/18/2024       Hospital Day: 3  Referring Provider: Rachid Nino MD    Patient independently seen and examined, Chart, Consults, Notes, Operative notes, Labs, Cardiology, and Radiology studies reviewed as available.    Chief complaint: Abnormal labs.    Subjective:  Ileana Odonnell is a 73 y.o. female for whom we were consulted for evaluation and treatment of acute kidney injury/chronic kidney disease.  She has stage IV chronic kidney disease baseline, follows ASHLEY Donohue in the office.  Patient has previous history of use of nonsteroidal agents.  Last estimated GFR in the office was 26 mL.  She was sent to the emergency room from her primary care office as she was found to have worsening of renal function.  Her serum creatinine now 4.2 mg.  Patient has noticed very little to no urine output.  She has been trying to drink at least 60 ounces of water a day.  Lately she has been complaining of nausea and constipation, unable to drink more water.    This morning patient feels well.  She has responded well to IV fluid and renal function is improving.  She is hoping to go home today.  She has promised that she will drink at least 60 ounces of water a day.    Allergies:  Patient has no known allergies.    Medicines:  Current Facility-Administered Medications   Medication Dose Route Frequency Provider Last Rate Last Admin    ondansetron (ZOFRAN-ODT) disintegrating tablet 4 mg  4 mg Oral Q8H PRN Kenroy Kitchen MD        Or    ondansetron (ZOFRAN) injection 4 mg  4 mg IntraVENous Q6H PRN Kenroy Kitchen MD   4 mg at 06/21/24 0936    acetaminophen (TYLENOL) tablet 650 mg  650 mg Oral Q6H PRN Kenroy Kitchen MD        Or    acetaminophen (TYLENOL)

## 2024-06-21 NOTE — CARE COORDINATION
06/21/24 1135   IMM Letter   IMM Letter given to Patient/Family/Significant other/Guardian/POA/by: Getachew Chong  (verbal)   IMM Letter date given: 06/21/24   IMM Letter time given: 1135     Pt received IMM states she will keep it but that she is ready to go home today and has no concerns.    Electronically signed by Getachew Chong MBA, BSW on 6/21/2024 at 11:36 AM

## 2024-06-21 NOTE — PLAN OF CARE
Problem: Discharge Planning  Goal: Discharge to home or other facility with appropriate resources  6/20/2024 2228 by Arnol Beach RN  Outcome: Progressing  6/20/2024 0944 by Destiny Bernabe RN  Outcome: Progressing  Flowsheets (Taken 6/20/2024 0937)  Discharge to home or other facility with appropriate resources:   Identify barriers to discharge with patient and caregiver   Arrange for needed discharge resources and transportation as appropriate     Problem: Safety - Adult  Goal: Free from fall injury  6/20/2024 2228 by Arnol Beach RN  Outcome: Progressing  6/20/2024 0944 by Destiny Bernabe RN  Outcome: Progressing     Problem: ABCDS Injury Assessment  Goal: Absence of physical injury  6/20/2024 2228 by Arnol Beach RN  Outcome: Progressing  6/20/2024 0944 by Destiny Bernabe RN  Outcome: Progressing

## 2024-06-21 NOTE — ACP (ADVANCE CARE PLANNING)
Advance Care Planning     Advance Care Planning Inpatient Note  [facility] Department    Today's Date: 6/21/2024  Unit: [facility]    Received request from IDT Member.  Upon review of chart and communication with care team, patient's decision making abilities are not in question.. Patient was/were present in the room during visit.    Goals of ACP Conversation:  Discuss advance care planning documents  Facilitate a discussion related to patient's goals of care as they align with the patient's values and beliefs.    Health Care Decision Makers:       Primary Decision Maker: Dakota Odonnell - Spouse - [phone]  Summary:  Documented Next of Kin, per patient report  Patient reported - having POA-HC; but would be discharged today.      Advance Care Planning Documents (Patient Wishes):  None     Assessment:  Patient appeared pleased about the outcome of her hospitalization and about the learning to care for self; understands well the value of ACP documents; gave her permission to put spouse as the primary Decision Maker.      Interventions:  Provided education on documents for clarity and greater understanding  Discussed and provided education on state decision maker hierarchy    Care Preferences Communicated:   No    Outcomes/Plan:  Designation of Health Care Decision Maker based on State Law    Electronically signed by TON Hernandez on 6/21/2024 at 10:51 AM

## 2024-06-21 NOTE — PROGRESS NOTES
Nursing spoke with Dr. Nino now and took the following medication discharge instructions verbally over the phone:     Hold Imdur  Hold Aldactone  Pt is to resume home lasix once daily every other day    Pt is to call for appointment with Dr. Qureshi and PCP on Monday morning.     Pt updated, discharge instructions will include above information as ordered.

## 2024-06-21 NOTE — CARE COORDINATION
Case Management Assessment  Initial Evaluation    Date/Time of Evaluation: 6/21/2024 11:45 AM  Assessment Completed by: Earl Atkins RN, BSN    If patient is discharged prior to next notation, then this note serves as note for discharge by case management.    Patient Name: Ileana Odonnell                   YOB: 1950  Diagnosis: RIVKA (acute kidney injury) (HCC) [N17.9]                   Date / Time: 6/18/2024 11:37 PM    Patient Admission Status: Inpatient   Readmission Risk (Low < 19, Mod (19-27), High > 27): Readmission Risk Score: 18    Current PCP: Shiloh Leyva MD  PCP verified by CM? Yes    Chart Reviewed: Yes      History Provided by: Patient  Patient Orientation: Alert and Oriented    Patient Cognition: Alert    Hospitalization in the last 30 days (Readmission):  No    If yes, Readmission Assessment in  Navigator will be completed.    Advance Directives:      Code Status: Full Code   Patient's Primary Decision Maker is: Legal Next of Kin    Primary Decision Maker: Dakota Odonnell - Spouse - [phone]    Discharge Planning:    Patient lives with: Spouse/Significant Other Type of Home: House  Primary Care Giver: Spouse  Patient Support Systems include: Spouse/Significant Other   Current Financial resources: Medicare  Current community resources: None  Current services prior to admission: Durable Medical Equipment            Current DME: Cane, Walker            Type of Home Care services:  OT, PT, Nursing Services    ADLS  Prior functional level: Assistance with the following:, Bathing, Mobility  Current functional level: Assistance with the following:, Bathing    PT AM-PAC:   /24  OT AM-PAC:   /24    Family can provide assistance at DC: Yes  Would you like Case Management to discuss the discharge plan with any other family members/significant others, and if so, who? Yes (spouse)  Plans to Return to Present Housing: Yes  Other Identified Issues/Barriers to RETURNING to current housing:  mobility  Potential Assistance needed at discharge: Home Care            Potential DME: Bedside Commode  Patient expects to discharge to: House  Plan for transportation at discharge: Family    Financial    Payor: MEDICARE / Plan: MEDICARE PART A AND B / Product Type: *No Product type* /     Does insurance require precert for SNF: No    Potential assistance Purchasing Medications: No  Meds-to-Beds request: Yes      [facility] [address] [phone] - F [phone]  [address] [place] [address]  Phone: [phone] Fax: [phone]      Notes:    Factors facilitating achievement of predicted outcomes: Family support, Cooperative, Pleasant, and Has needed Durable Medical Equipment at home    Barriers to discharge: Decreased endurance, Upper extremity weakness, Lower extremity weakness, Long standing deficits, and Medical complications    Additional Case Management Notes:   Patient states that she lives with her  in a one level house. She states she has all the needed equipment needed at home. She would like home healthcare upon discharge for Nursing & PT/OT. Will continue to follow.    The Plan for Transition of Care is related to the following treatment goals of RIVKA (acute kidney injury) (HCC) [N17.9]    IF APPLICABLE: The Patient and/or patient representative Ileana and her family were provided with a choice of provider and agrees with the discharge plan. Freedom of choice list with basic dialogue that supports the patient's individualized plan of care/goals and shares the quality data associated with the providers was provided to:     Patient Representative Name:       The Patient and/or Patient Representative Agree with the Discharge Plan?      Electronically signed by Earl Atkins RN, BSN on 6/21/2024 at 11:47 AM

## 2024-06-21 NOTE — PROGRESS NOTES
[facility] Hospitalists Progress Note    Patient:  Ileana Odonnell  YOB: 1950  Date of Service: 6/21/2024  MRN: [id number]   Acct: [id number]   Primary Care Physician: Shiloh Leyva MD  Advance Directive: Full Code  Admit Date: 6/18/2024       Hospital Day: 3        CHIEF COMPLAINT:  Abnormal lab work     6/21/2024 8:46 AM  Subjective / Interval History:   06/21/2024  No acute changes or acute overnight event reported. Patient seen and examined this a.m. No new complaints.   Laying comfortably in bed in no apparent acute distress.  Denies any acute complaints or distress.  No shortness of breath, no chest pain, no dizziness, lightheadedness or palpitations.  Endorses overall improvement.        06/20/2024  Patient seen and examined this a.m. No new complaints.   Endorses overall improvement.   =No acute changes or acute overnight event reported. Laying comfortably in bed in no apparent acute distress.  Denies any acute complaints or distress.        Review of Systems:   Review of Systems  ROS: 14 point review of systems is negative except as specifically addressed above.    ADULT DIET; Regular; Low Fat/Low Chol/High Fiber/2 gm Na    Intake/Output Summary (Last 24 hours) at 6/21/2024 0846  Last data filed at 6/20/2024 2152  Gross per 24 hour   Intake 1852 ml   Output --   Net 1852 ml         Medications:   lactated ringers IV soln 100 mL/hr at 06/19/24 0053     Current Facility-Administered Medications   Medication Dose Route Frequency Provider Last Rate Last Admin    ondansetron (ZOFRAN-ODT) disintegrating tablet 4 mg  4 mg Oral Q8H PRN Kenroy Kitchen MD        Or    ondansetron (ZOFRAN) injection 4 mg  4 mg IntraVENous Q6H PRN Kenroy Kitchen MD        acetaminophen (TYLENOL) tablet 650 mg  650 mg Oral Q6H PRN Kenroy Kitchen MD        Or    acetaminophen (TYLENOL) suppository 650 mg  650 mg Rectal Q6H PRN Kenryo Kitchen MD        lactated ringers IV soln infusion   IntraVENous Continuous Imtiaz  Insomnia 6/21/2024 Yes    Obstructive sleep apnea (adult) (pediatric) 6/21/2024 Yes     Principal Problem:    RIVKA (acute kidney injury) (HCC)  Active Problems:    Bipolar disorder (HCC)    Essential hypertension    Gastro-esophageal reflux disease without esophagitis    Generalized anxiety disorder    Hyperlipidemia    Insomnia    Obstructive sleep apnea (adult) (pediatric)  Resolved Problems:    * No resolved hospital problems. *          Brief History/Summary:   As per Initial admission HPI 6/18/2024, quoted below;  Ms Odonnell,a \"73-year-old female with history of CKD unknown stage admitted for RIVKA.   Patient initially presented to OSH ER with abnormal lab work. History of CKD unknown stage. Lab work revealed RIVKA. Patient transferred to this facility for escalation of care. Upon interview and examination, patient states she has not been feeling well x 1 week. Poor oral intake. Decreased urine output. Denies dysuria, hematuria, nausea, vomiting, abdominal pain, chest pain, or palpitations. No further history provided. \"      RIVKA on CKD stage IV  OSH lab work: Cr 4.65, BUN 77, K 4.0, CO2 30.7, AG 9, Na 138 - 06/19/2024  Creatinine level on presentation: 4.2 (06/19/2024)  IV hydration -   Renal Ultrasound Complete (06/19/2024): No hydronephrosis. Suboptimal visualization of the right kidney. 3.7 cm left renal cyst.  Avoid hypotension & Nephrotoxins   Consider Nephrology Consulted on admission    History of DVT  Apixaban 5 mg p.o. twice daily        Continue management of other chronic medical conditions - see above and orders.          Advance Directive: Full Code    ADULT DIET; Regular; Low Fat/Low Chol/High Fiber/2 gm Na         Consults Made:   IP CONSULT TO NEPHROLOGY      DVT prophylaxis: Apixaban      Current medications reviewed  Lab work reviewed  Radiology  films reviewed  Much appreciated treatment recommendations from suspecialities reviewed  Discussed treatment plan with the nurse and addressed all

## 2024-06-21 NOTE — PROGRESS NOTES
06/21/24 1046   Encounter Summary   Encounter Overview/Reason Advance Care Planning   Service Provided For Patient   Referral/Consult From Multi-disciplinary team   Support System Spouse;Children;Family members   Complexity of Encounter Low   Begin Time 1024   End Time  1048   Total Time Calculated 24 min   Spiritual/Emotional needs   Type Spiritual Support   Rituals, Rites and Sacraments   Type Blessings   Advance Care Planning   Type ACP conversation   Assessment/Intervention/Outcome   Assessment Calm  (Pt appeares cheerful, said \"Be discharged today.\")   Intervention Sustaining Presence/Ministry of presence;Active listening  (Elicited more conversation from the patient)   Outcome Optimistic;New perspective/awareness  (Pt shared \"Need to drink 4 glasses of water every day.\")   Plan and Referrals   Plan/Referrals No future visits requested   Does the patient have a [facility] PCP? Yes    to follow up after discharge? No     Electronically signed by SHANNON Hernandez on 6/21/2024 at 10:51 AM

## 2024-06-21 NOTE — DISCHARGE SUMMARY
[address] [place] [address]  [facility]    DISCHARGE SUMMARY:        Ileana Odonnell  :  1950  MRN:  [id number]    Admit date:  2024  Discharge date: 2024      Admitting Physician:  No admitting provider for patient encounter.    Advance Directive: Full Code    Consults Made:   IP CONSULT TO NEPHROLOGY  IP CONSULT TO SPIRITUAL SERVICES      Primary Care Physician:  Shiloh Leyva MD    Discharge Diagnoses:  Principal Problem:    RIVKA (acute kidney injury) (HCC)  Active Problems:    Bipolar disorder (HCC)    Atherosclerotic heart disease of native coronary artery without angina pectoris    Essential hypertension    Gastro-esophageal reflux disease without esophagitis    Generalized anxiety disorder    Hyperlipidemia    Heart failure with mildly reduced ejection fraction (HCC)    Insomnia    Obstructive sleep apnea (adult) (pediatric)    Stage 3 chronic kidney disease (HCC)  Resolved Problems:    * No resolved hospital problems. *          Pertinent Labs:  CBC with DIFF:  Recent Labs     24  0338   WBC 9.1 10.5   RBC 3.87* 3.50*   HGB 12.5 11.8*   HCT 39.3 36.4*   .6* 104.0*   MCH 32.3* 33.7*   MCHC 31.8* 32.4*   RDW 12.4 12.7    314   MPV 10.0 9.8   NEUTOPHILPCT 44.1*  --    LYMPHOPCT 43.3*  --    MONOPCT 8.6  --    BASOPCT 0.7  --    NEUTROABS 4.0  --    MONOSABS 0.80  --    EOSABS 0.30  --    BASOSABS 0.10  --        CMP/BMP:  Recent Labs     24  0243 06/20/24  0938 24  0338 24  1422    139 143 138   K 3.7 4.0 4.1 3.9   CL 98 101 106 103   CO2 25 25 26 25   ANIONGAP 18 13 11 10   GLUCOSE 100 110* 92 131*   BUN 73* 55* 44* 39*   CREATININE 4.2* 2.9* 2.5* 2.3*   LABGLOM 11* 16* 20* 22*   CALCIUM 9.4 8.9 9.4 8.9   BILITOT 0.5  --  0.6  --    ALKPHOS 120*  --  105*  --    ALT 10  --  10  --    AST 17  --  16  --          CRP:  No results for input(s): \"CRP\" in the last 72 hours.  Sed Rate:  No results for           STOP taking these medications      famotidine 40 MG tablet  Commonly known as: PEPCID                Discharge Instructions:   Follow up with Shiloh Leyva MD in 7 days.  Take medications as directed.  Resume activity as tolerated.      Recommended Follow Up:  Shiloh Leyva MD  [address]  [place] [id number] [phone]    Follow up  Follow up within 3 business days.      Followup Appointments Scheduled at Time of Discharge:  No future appointments.       Diet: ADULT DIET; Regular; Low Fat/Low Chol/High Fiber/2 gm Na        DISCHARGE STATUS:    Condition on Discharge: stable    Disposition: Patient is medically stable and will be discharged Home      Extended Emergency Contact Information  Primary Emergency Contact: Dakota Odonnell  Address: [address] [place] [address] [facility]  Home Phone: [phone]  Mobile Phone: [phone]  Relation: Spouse  Secondary Emergency Contact: KATHRINE SANDOVAL  Home Phone: [phone]  Relation: Child         Time Spent on discharge is   36 mins  in the examination, evaluation, counseling and review of medications and discharge plan.     Electronically signed by   Rachid Nino MD,   Internal Medicine Hospitalist   6/21/2024 4:17 PM      Thank you Shiloh Leyva MD for the opportunity to be involved in this patient's care. If you have any questions or concerns please feel free to contact me at [phone]        [employer] Dragon/Transcription disclaimer:   Much of this encounter note is an electronic transcription/translation of spoken language to printed text. The electronic translation of spoken language may permit erroneous, or at times, nonsensical words or phrases to be inadvertently transcribed; although attempts have made to review the note for such errors, some may still exist.

## 2024-08-12 ENCOUNTER — HOME HEALTH ADMISSION (OUTPATIENT)
Dept: HOME HEALTH SERVICES | Facility: HOME HEALTHCARE | Age: 74
End: 2024-08-12
Payer: MEDICARE